# Patient Record
Sex: FEMALE
[De-identification: names, ages, dates, MRNs, and addresses within clinical notes are randomized per-mention and may not be internally consistent; named-entity substitution may affect disease eponyms.]

---

## 2019-03-28 ENCOUNTER — HOSPITAL ENCOUNTER (OUTPATIENT)
Dept: HOSPITAL 88 - RAD | Age: 44
End: 2019-03-28
Attending: FAMILY MEDICINE
Payer: COMMERCIAL

## 2019-03-28 DIAGNOSIS — M79.672: Primary | ICD-10-CM

## 2019-03-28 DIAGNOSIS — M79.671: ICD-10-CM

## 2019-03-28 NOTE — DIAGNOSTIC IMAGING REPORT
Radiographs of the right heel. Radiographs of the left heel. 



HISTORY:  Pain

COMPARISON: None available.

     

FINDINGS:

Bones:

No acute displaced fracture.  

Osseous alignment is within normal limits.



Joints:

Scattered degenerative change. No osseous erosion. Small inferior calcaneal

bone spurs.



Soft tissues:

The soft tissues appear unremarkable.





IMPRESSION: 

Scattered degenerative change. No osseous erosion. Small inferior calcaneal

bone spurs.



Signed by: Dr. Napoleon Hartman M.D. on 3/28/2019 11:46 AM